# Patient Record
(demographics unavailable — no encounter records)

---

## 2025-03-24 NOTE — HISTORY OF PRESENT ILLNESS
[FreeTextEntry1] : 55-year-old postmenopausal woman with a family history of breast and other malignancies presents for follow-up and surveillance as a high risk patient.  Patient denies any breast masses or nipple discharge.  Patient is overdue for her films.  Patient's had 3 benign breast biopsies before in the past without atypia.  Patient has never taken hormone replacement therapy.  Patient has a mother with breast cancer at age 47 and her Bailey model shows a 22.8% lifetime risk of breast cancer.  In addition there is pancreatic, liver, bladder and colorectal cancer in her family.  There are some question of a gene positivity in her mother for melanoma pancreatic gene.

## 2025-03-24 NOTE — REASON FOR VISIT
[Consultation] : a consultation visit [FreeTextEntry1] : High risk patient for advanced surveillance